# Patient Record
(demographics unavailable — no encounter records)

---

## 2025-04-07 NOTE — PLAN
[No delays noted, anticipatory developmental guidance given.] : No delays noted, anticipatory developmental guidance given.  [Adjusted age milestones discussed at length.] : Adjusted age milestones discussed at length. [Reading daily was encouraged.] : Reading daily was encouraged.  [Parent was counseled regarding AAP recommendations concerning television watching under the age of two.] : Parent was counseled regarding AAP recommendations concerning television watching under the age of two.  [Parent counseled to decrease milk intake to 16 ounces daily at one year.] : Parent counseled to decrease milk intake to 16 ounces daily at one year.  [Safety counseling given regarding major safety issues for children this age.] : Safety counseling given regarding major safety issues for children this age. [FreeTextEntry2] : www.pathways.org  [FreeTextEntry1] : BARBARA will return for a follow up visit in 6-8 months.   Edison White MD Director, Division of Developmental-Behavioral Pediatrics [facility], [facility] Certified Developmental-Behavioral Pediatrician

## 2025-04-07 NOTE — REASON FOR VISIT
[Follow-Up ] : a  follow-up for [Mother] : mother [Other: _____] : [unfilled] [FreeTextEntry3] : neurodevelopmental evaluation secondary to being at high risk for developmental delays given history of prematurity.

## 2025-04-07 NOTE — HISTORY OF PRESENT ILLNESS
[Gestational Age: ___] : Gestational Age in Weeks: [unfilled] [Chronological Age: ___] : Chronological Age in Months: [unfilled] [Corrected Age: ___] : Corrected Age: [unfilled] [Ophthalmology: ___] : Ophthalmology: [unfilled] [No Feeding Issues] : no feeding issues. [Normal] : normal [None] : none [de-identified] : - audiology: on 3/11/24 failed ABR screening bilaterally; repeat ABR on 3/19/24 indicated mild conductive hearing loss at 500Hz (low frequency) improving to normal hearing sensitivity in the high frequencies bilaterally; 4/30/24 ABR indicated normal hearing sensitivity bilaterally and hearing evaluation as needed

## 2025-04-07 NOTE — BIRTH HISTORY
[At ___ Weeks Gestation] : at [unfilled] weeks gestation [ Section] : by  section [Multiple Gestation] : multiple gestation [FreeTextEntry1] : 1030 g (48%)    Birth length: 36.5cm (57%)    Birth head circumference: 25m (40%) [FreeTextEntry5] : history of mono di twins pprom 12/27, twin to twin transfusion s/p laser surgery at [facility] on 10/13/23. Elevated GCT, GTT not done. h/o chorioamnionitis. Received Celestone and Mg Prenatal labs: HIV-neg, HBsAG-neg, RPR-nl, Rubella immune, GBS-neg. [de-identified] : umbilical cord + knots [FreeTextEntry4] : required CPAP in  [FreeTextEntry3] : NICU stay of 52 days. Required respiratory support CPAP DOL 7 to 22 then NIMV DOL 22 to 28 then CPAP DOL 28 to 33 then HFNC DOL 33 to 42 then room air DOL 42 onward & received surfactant x1 and caffeine. hyperbilirubinemia required phototherapy DOL 2 to 4. PDA s/p acetaminophen x1 and repeat ECHO was within normal limits. r/o sepsis on ampicillin and gentamycin for 36 hours until blood cultures were negative. full enteral feeds by DOL 9. HUS was normal on DOL 7 and DOL 30.  hearing screen passed on left and failed on right. CMV urine sent. ROP exam on  showed Stage 0 to Zone II, no plus disease, OU. 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 and  showed Stage 0 Zone II ROP, no plus disease.

## 2025-04-07 NOTE — PHYSICAL EXAM
[Manipulates Fingers] : manipulates fingers [Soothes When Picked Up] : soothes when picked up  [Social Smile] : has a social smile [Orients To Voice] : orients to voice [Laughs Aloud] : laughs aloud ["Blanca Alexander"] : blanca tinsley [Razzing] : razzing [Creep] : creeps [Crawl] : crawls  [Come to Sit] : comes to sit [Pull to Stand] : pulls to stand [Cruise] : cruises [Transfer] : transfers objects [Mature Pincer] : has mature pincer [Voluntary Release] : voluntary release  [Alert To Sounds] : alert to sounds [Gesture Language] : gestures language [Understands "No"] : understands "No" [1 Step Command with Gesture] : follows 1 step commands with gesture ["Carroll" Appropriately] : says "Carroll" appropriately ["Mama" Appropriately] : says "Mama" appropriately [Normal] : awake and interactive, normal strength tone throughout. [Walk Alone] : does not walk alone [de-identified] : right side slight flattened parietal, intact extraocular movements observed, nonicteric sclera bilaterally  [de-identified] : full range of motion of upper and lower extremities